# Patient Record
Sex: MALE | Race: WHITE | Employment: FULL TIME | URBAN - METROPOLITAN AREA
[De-identification: names, ages, dates, MRNs, and addresses within clinical notes are randomized per-mention and may not be internally consistent; named-entity substitution may affect disease eponyms.]

---

## 2018-08-17 ENCOUNTER — OFFICE VISIT (OUTPATIENT)
Dept: FAMILY MEDICINE | Facility: CLINIC | Age: 33
End: 2018-08-17
Payer: OTHER GOVERNMENT

## 2018-08-17 VITALS
TEMPERATURE: 97 F | DIASTOLIC BLOOD PRESSURE: 92 MMHG | OXYGEN SATURATION: 97 % | SYSTOLIC BLOOD PRESSURE: 142 MMHG | HEIGHT: 73 IN | HEART RATE: 64 BPM | BODY MASS INDEX: 30.88 KG/M2 | WEIGHT: 233 LBS

## 2018-08-17 DIAGNOSIS — Z01.818 PREOP GENERAL PHYSICAL EXAM: Primary | ICD-10-CM

## 2018-08-17 DIAGNOSIS — S83.511S RUPTURE OF ANTERIOR CRUCIATE LIGAMENT OF RIGHT KNEE, SEQUELA: ICD-10-CM

## 2018-08-17 LAB
HGB BLD-MCNC: 14.9 G/DL (ref 13.3–17.7)
PLATELET # BLD AUTO: 254 10E9/L (ref 150–450)

## 2018-08-17 PROCEDURE — 99204 OFFICE O/P NEW MOD 45 MIN: CPT | Performed by: INTERNAL MEDICINE

## 2018-08-17 PROCEDURE — 36415 COLL VENOUS BLD VENIPUNCTURE: CPT | Performed by: INTERNAL MEDICINE

## 2018-08-17 PROCEDURE — 93000 ELECTROCARDIOGRAM COMPLETE: CPT | Performed by: INTERNAL MEDICINE

## 2018-08-17 PROCEDURE — 85049 AUTOMATED PLATELET COUNT: CPT | Performed by: INTERNAL MEDICINE

## 2018-08-17 PROCEDURE — 85018 HEMOGLOBIN: CPT | Performed by: INTERNAL MEDICINE

## 2018-08-17 NOTE — MR AVS SNAPSHOT
After Visit Summary   8/17/2018    Phoenix Hackett    MRN: [id number]           Patient Information     Date Of Birth          1985        Visit Information        Provider Department      8/17/2018 11:40 AM Arleen Patel MD [facility]        Today's Diagnoses     Preop general physical exam    -  1    Rupture of anterior cruciate ligament of right knee, sequela          Care Instructions      Before Your Surgery      Call your surgeon if there is any change in your health. This includes signs of a cold or flu (such as a sore throat, runny nose, cough, rash or fever).    Do not smoke, drink alcohol or take over the counter medicine (unless your surgeon or primary care doctor tells you to) for the 24 hours before and after surgery.    If you take prescribed drugs: Follow your doctor s orders about which medicines to take and which to stop until after surgery.    Eating and drinking prior to surgery: follow the instructions from your surgeon    Take a shower or bath the night before surgery. Use the soap your surgeon gave you to gently clean your skin. If you do not have soap from your surgeon, use your regular soap. Do not shave or scrub the surgery site.  Wear clean pajamas and have clean sheets on your bed.           Follow-ups after your visit        Who to contact     If you have questions or need follow up information about today's clinic visit or your schedule please contact [facility] directly at [phone].  Normal or non-critical lab and imaging results will be communicated to you by MyChart, letter or phone within 4 business days after the clinic has received the results. If you do not hear from us within 7 days, please contact the clinic through MyChart or phone. If you have a critical or abnormal lab result, we will notify you by phone as soon as possible.  Submit refill requests through [employer] or call your pharmacy and they will forward the refill  "request to us. Please allow 3 business days for your refill to be completed.          Additional Information About Your Visit        Care EveryWhere ID     This is your Care EveryWhere ID. This could be used by other organizations to access your [facility] medical records  [id number]        Your Vitals Were     Pulse Temperature Height Pulse Oximetry BMI (Body Mass Index)       64 97  F (36.1  C) (Oral) 6' 1\" (1.854 m) 97% 30.74 kg/m2        Blood Pressure from Last 3 Encounters:   08/17/18 (!) 142/92    Weight from Last 3 Encounters:   08/17/18 233 lb (105.7 kg)              We Performed the Following     EKG 12-lead complete w/read - Clinics     Hemoglobin     Platelet count        Primary Care Provider Office Phone # Fax #    Arleen Miguel Patel [phone] [address] [facility] [address]  [place] [address]        Equal Access to Services     [facility] : Hadii aad ku hadasho Soomaali, waaxda luqadaha, qaybta kaalmada adeegyada, waxay idiin hayaan james gonzalezaralexii beyn . So [facility] [phone].    ATENCIÓN: Si habla español, tiene a stallworth disposición servicios gratuitos de asistencia lingüística. Rene al [phone].    We comply with applicable federal civil rights laws and [place] laws. We do not discriminate on the basis of race, color, national origin, age, disability, sex, sexual orientation, or gender identity.            Thank you!     Thank you for choosing [facility]  for your care. Our goal is always to provide you with excellent care. Hearing back from our patients is one way we can continue to improve our services. Please take a few minutes to complete the written survey that you may receive in the mail after your visit with us. Thank you!             Your Updated Medication List - Protect others around you: Learn how to safely use, store and throw away your medicines at www.disposemymeds.org.          This list is accurate as of 8/17/18 12:48 PM.  Always use your most recent " med list.                   Brand Name Dispense Instructions for use Diagnosis    ACETAMINOPHEN PO           ALEVE PO

## 2018-08-17 NOTE — PROGRESS NOTES
[facility]  [address] [place] [id number]  [phone]  Dept: [phone]    PRE-OP EVALUATION:  Today's date: 2018    Phoenix Hackett (: 1985) presents for pre-operative evaluation assessment as requested by Dr. Lindquist.  He requires evaluation and anesthesia risk assessment prior to undergoing surgery/procedure for treatment of right ACL tear.    Proposed Surgery/ Procedure: ACL reconstruction  Date of Surgery/ Procedure: 18  Time of Surgery/ Procedure: 1200 pm  Hospital/Surgical Facility: [facility]  Fax number for surgical facility: [phone]  Primary Physician: Arleen Patel  Type of Anesthesia Anticipated: to be determined    Patient has a Health Care Directive or Living Will:  YES     1. NO - Do you have a history of heart attack, stroke, stent, bypass or surgery on an artery in the head, neck, heart or legs?  2. NO - Do you ever have any pain or discomfort in your chest?  3. NO - Do you have a history of  Heart Failure?  4. NO - Are you troubled by shortness of breath when: walking on the level, up a slight hill or at night?  5. NO - Do you currently have a cold, bronchitis or other respiratory infection?  6. NO - Do you have a cough, shortness of breath or wheezing?  7. NO - Do you sometimes get pains in the calves of your legs when you walk?  8. NO - Do you or anyone in your family have previous history of blood clots?  9. NO - Do you or does anyone in your family have a serious bleeding problem such as prolonged bleeding following surgeries or cuts?  10. NO - Have you ever had problems with anemia or been told to take iron pills?  11. NO - Have you had any abnormal blood loss such as black, tarry or bloody stools, or abnormal vaginal bleeding?  12. NO - Have you ever had a blood transfusion?  13. NO - Have you or any of your relatives ever had problems with anesthesia?  14. NO - Do you have sleep apnea, excessive snoring or daytime  "drowsiness?  15. NO - Do you have any prosthetic heart valves?  16. NO - Do you have prosthetic joints?  17. NO - Is there any chance that you may be pregnant?      HPI:     HPI related to upcoming procedure: patient Phoenix Hackett is a very pleasant 33 year old male with right ACL injury who presents to internal medicine clinci for a pre op cardiac evaluation for upcoming ortho surgery for ACL reconstruction for treatment of right ACL tear. Patient denies any allergies to anesthesia agents. Patient denies any CAD, CVA or Type 2 Diabetes. Patient does not take any daily aspirin or any other blood thinner medications. Patient denies any chest pain, headaches, fever or chills.        MEDICAL HISTORY:   There are no active problems to display for this patient.     Past Medical History:   Diagnosis Date     ACL tear      No past surgical history on file.  No current outpatient prescriptions on file.     OTC products: None, except as noted above    Allergies not on file   Latex Allergy: NO    Social History   Substance Use Topics     Smoking status: Not on file     Smokeless tobacco: Not on file     Alcohol use Not on file     History   Drug Use Not on file       REVIEW OF SYSTEMS:   Constitutional, neuro, ENT, endocrine, pulmonary, cardiac, gastrointestinal, genitourinary, musculoskeletal, integument and psychiatric systems are negative, except as otherwise noted.    EXAM:   BP (!) 142/92 (BP Location: Right arm, Patient Position: Sitting, Cuff Size: Adult Large)  Pulse 64  Temp 97  F (36.1  C) (Oral)  Ht 6' 1\" (1.854 m)  Wt 233 lb (105.7 kg)  SpO2 97%  BMI 30.74 kg/m2    GENERAL APPEARANCE: healthy, alert and no distress     EYES: EOMI,  PERRL     HENT: ear canals and TM's normal and nose and mouth without ulcers or lesions     NECK: no adenopathy, no asymmetry, masses, or scars and thyroid normal to palpation     RESP: lungs clear to auscultation - no rales, rhonchi or wheezes     CV: regular rates and " rhythm, normal S1 S2, no S3 or S4 and no murmur, click or rub     ABDOMEN:  soft, nontender, no HSM or masses and bowel sounds normal     MS: right knee pains noted     SKIN: no suspicious lesions or rashes     NEURO: Normal strength and tone, sensory exam grossly normal, mentation intact and speech normal     PSYCH: mentation appears normal. and affect normal/bright     LYMPHATICS: No cervical adenopathy    DIAGNOSTICS:   EKG: Sinus  Bradycardia  -First degree A-V block   Uriah = 228  -Anterolateral ST-elevation -repolarization variant.   BORDERLINE RHYTHM   no acute ST/T changes c/w ischemia, no LVH by voltage criteria    Results for orders placed or performed in visit on 08/17/18   Platelet count   Result Value Ref Range    Platelet Count 254 150 - 450 10e9/L   Hemoglobin   Result Value Ref Range    Hemoglobin 14.9 13.3 - 17.7 g/dL         IMPRESSION:   Reason for surgery/procedure: right ACL tear  Diagnosis/reason for consult: pre op cardiac evaluation for upcoming ortho surgery for ACL reconstruction for treatment of right ACL tear.    The proposed surgical procedure is considered INTERMEDIATE risk.    REVISED CARDIAC RISK INDEX  The patient has the following serious cardiovascular risks for perioperative complications such as (MI, PE, VFib and 3  AV Block):  No serious cardiac risks  INTERPRETATION: 0 risks: Class I (very low risk - 0.4% complication rate)    The patient has the following additional risks for perioperative complications:  No identified additional risks      ICD-10-CM    1. Preop general physical exam Z01.818 EKG 12-lead complete w/read - Clinics   2. Rupture of anterior cruciate ligament of right knee, sequela S83.511S        RECOMMENDATIONS:     --Patient is to take all scheduled medications on the day of surgery.    APPROVAL GIVEN to proceed with proposed procedure, without further diagnostic evaluation       Signed Electronically by: Arleen Patel MD    Copy of this evaluation report is  provided to requesting physician.    [facility] Preop Guidelines    Revised Cardiac Risk Index

## 2018-08-17 NOTE — LETTER
[facility]  [address] [facility]  [address]  [place]  [address]  Tel: [phone]    August 17, 2018    Phoenix Hackett  [address] [place] [address]        Dear Mr. Hackett,    The results of your recent lab results OK    If you have any further questions or problems, please contact our office.      Sincerely,    Miguel Patel MD/REENA          Enclosure: Lab Results  Results for orders placed or performed in visit on 08/17/18   Platelet count   Result Value Ref Range    Platelet Count 254 150 - 450 10e9/L   Hemoglobin   Result Value Ref Range    Hemoglobin 14.9 13.3 - 17.7 g/dL

## 2018-08-20 ENCOUNTER — TELEPHONE (OUTPATIENT)
Dept: FAMILY MEDICINE | Facility: CLINIC | Age: 33
End: 2018-08-20

## 2018-08-20 NOTE — TELEPHONE ENCOUNTER
I called patient and he said that his insurance stated his pcp is to complete their electronic form.  Not normal practice but I told him to let us know where to find the form.   Reba Bianchi MA

## 2018-08-20 NOTE — TELEPHONE ENCOUNTER
This pre-certification should be handled through the surgeon's office. Reason being, we can certainly refer patient's to surgeons for various reasons, but it is ultimately the surgeon that determines medical necessity.    I called Phoenix at his cell # to let him know he needs to call Dr Lindquist's office to have them work with [facility] on this pre-certification.    He may call me back with questions.    Maxim Rowe, CMA

## 2018-08-20 NOTE — TELEPHONE ENCOUNTER
Reason for Call:  Pre Authorization     Detailed comments: Pt states that [facility] is requesting that Dr. Patel send a Pre authorization surgery request   Pt has ACL Surgery on 8/30 @ [facility]    ( Pt will call back with fax number)     Phone Number Patient can be reached at: Home number on file [phone] (home)    Best Time: any    Can we leave a detailed message on this number? YES    Call taken on 8/20/2018 at 10:34 AM by Reba Worrell